# Patient Record
Sex: MALE | Employment: FULL TIME | URBAN - METROPOLITAN AREA
[De-identification: names, ages, dates, MRNs, and addresses within clinical notes are randomized per-mention and may not be internally consistent; named-entity substitution may affect disease eponyms.]

---

## 2022-05-25 ENCOUNTER — OFFICE VISIT (OUTPATIENT)
Dept: URGENT CARE | Facility: URGENT CARE | Age: 37
End: 2022-05-25
Payer: COMMERCIAL

## 2022-05-25 VITALS
BODY MASS INDEX: 32.11 KG/M2 | HEIGHT: 67 IN | OXYGEN SATURATION: 96 % | WEIGHT: 204.6 LBS | TEMPERATURE: 98 F | HEART RATE: 98 BPM | DIASTOLIC BLOOD PRESSURE: 91 MMHG | SYSTOLIC BLOOD PRESSURE: 136 MMHG

## 2022-05-25 DIAGNOSIS — M25.472 LEFT ANKLE SWELLING: ICD-10-CM

## 2022-05-25 DIAGNOSIS — M25.572 PAIN IN JOINT, ANKLE AND FOOT, LEFT: ICD-10-CM

## 2022-05-25 DIAGNOSIS — M10.9 ACUTE GOUT OF LEFT ANKLE, UNSPECIFIED CAUSE: Primary | ICD-10-CM

## 2022-05-25 LAB — URATE SERPL-MCNC: 8.3 MG/DL (ref 3.5–7.2)

## 2022-05-25 PROCEDURE — 99203 OFFICE O/P NEW LOW 30 MIN: CPT | Performed by: NURSE PRACTITIONER

## 2022-05-25 PROCEDURE — 36415 COLL VENOUS BLD VENIPUNCTURE: CPT | Performed by: NURSE PRACTITIONER

## 2022-05-25 PROCEDURE — 84550 ASSAY OF BLOOD/URIC ACID: CPT | Performed by: NURSE PRACTITIONER

## 2022-05-25 RX ORDER — ACETAMINOPHEN 325 MG/1
325-650 TABLET ORAL EVERY 6 HOURS PRN
COMMUNITY

## 2022-05-25 RX ORDER — PREDNISONE 20 MG/1
20 TABLET ORAL 2 TIMES DAILY
Qty: 10 TABLET | Refills: 0 | Status: SHIPPED | OUTPATIENT
Start: 2022-05-25 | End: 2022-05-30

## 2022-05-25 NOTE — PATIENT INSTRUCTIONS
Recommend ibuprofen 4 to 600 mg every 4-6 hours as needed with food.  Rest left lower extremity with elevation every 2-4 hours to heart level or higher.  We will call you with your uric acid results.  Oral prednisone for gout flare take this twice daily with food may cause jitteriness and wakefulness therefore please do not take too close to bedtime.  If symptoms or not improving in 5 to 7 days recommend follow-up with your primary care provider if symptoms worsen please follow-up in emergency department.

## 2022-05-25 NOTE — PROGRESS NOTES
"Assessment & Plan     1. Acute gout of left ankle, unspecified cause  Unclear if this is a true gout negative xray pending uric acid. His diet eating out a lot and low fluid intake with family history. Unclear injury feels he may have twisted however not improving symptoms waxing and waning. Negative history of DVT does not elicit pain in calf or other area negative for erythema or cms changes. If symptoms do not improve recommend he follow up with his pcp and or orthopedic for further evaluation  Hand outs given from epic.   Verbalized understanding; will monitor symptoms closely. Reviewed s/e to medications.     - predniSONE (DELTASONE) 20 MG tablet; Take 1 tablet (20 mg) by mouth 2 times daily for 5 days  Dispense: 10 tablet; Refill: 0    2. Pain in joint, ankle and foot, left    - Uric acid  - XR Ankle Left G/E 3 Views    3. Left ankle swelling    - Uric acid  - XR Ankle Left G/E 3 Views    Cathy George, PABLO [facility]    Ileana Stewart is a 36 year old male who presents to clinic today for the following health issues:  Chief Complaint   Patient presents with     Ankle Pain     Right ankle pain, Pt doesn't know what happened.     HPI    Pain right ankle is worse at night. He has tried elevation, wrapping, and taking tylenol alternating with ibuprofen. He states symptoms started about 3 weeks ago, improved and now worsening again. Pain increases with stair. He feels he may have twisted his ankle but is not sure.       Review of Systems  Constitutional, HEENT, cardiovascular, pulmonary, gi and gu systems are negative, except as otherwise noted.      Objective    BP (!) 136/91 (BP Location: Left arm, Patient Position: Sitting, Cuff Size: Adult Regular)   Pulse 98   Temp 98  F (36.7  C) (Tympanic)   Ht 1.702 m (5' 7\")   Wt 92.8 kg (204 lb 9.6 oz)   SpO2 96%   BMI 32.04 kg/m    Physical Exam   GENERAL: healthy, alert and no distress  NECK: no adenopathy, no " asymmetry, masses, or scars and thyroid normal to palpation  RESP: lungs clear to auscultation - no rales, rhonchi or wheezes  CV: regular rate and rhythm, normal S1 S2, no S3 or S4, no murmur, click or rub, no peripheral edema and peripheral pulses strong  MS: mild swelling lateral right ankle with mild warmth and tender to touch. Full ROM pain with dorsi and plantar flexion. CMS and pulses normal.      Results for orders placed or performed in visit on 05/25/22   XR Ankle Left G/E 3 Views     Status: None    Narrative    LEFT ANKLE THREE OR MORE VIEWS    5/25/2022 1:43 PM     HISTORY: Pain in joint, ankle and foot, left. Left ankle swelling.    COMPARISON: None.      Impression    IMPRESSION: Normal joint spacing and alignment. No evidence of acute  fracture.     EDI ARIZA MD         SYSTEM ID:  [id number]

## 2022-05-26 NOTE — RESULT ENCOUNTER NOTE
Left message regarding elevated uric acid level will follow up with PCP continue plan of care discussed in clinic.   Thank you  Cathy George CNP